# Patient Record
Sex: FEMALE | Race: WHITE | Employment: FULL TIME | URBAN - NONMETROPOLITAN AREA
[De-identification: names, ages, dates, MRNs, and addresses within clinical notes are randomized per-mention and may not be internally consistent; named-entity substitution may affect disease eponyms.]

---

## 2019-12-12 ENCOUNTER — TRANSFERRED RECORDS (OUTPATIENT)
Dept: HEALTH INFORMATION MANAGEMENT | Facility: OTHER | Age: 39
End: 2019-12-12

## 2020-02-24 ENCOUNTER — TRANSFERRED RECORDS (OUTPATIENT)
Dept: HEALTH INFORMATION MANAGEMENT | Facility: OTHER | Age: 40
End: 2020-02-24

## 2020-04-07 ENCOUNTER — TRANSFERRED RECORDS (OUTPATIENT)
Dept: HEALTH INFORMATION MANAGEMENT | Facility: OTHER | Age: 40
End: 2020-04-07

## 2020-06-08 ENCOUNTER — TRANSFERRED RECORDS (OUTPATIENT)
Dept: HEALTH INFORMATION MANAGEMENT | Facility: OTHER | Age: 40
End: 2020-06-08

## 2020-08-11 DIAGNOSIS — M79.671 RIGHT FOOT PAIN: Primary | ICD-10-CM

## 2020-08-13 ENCOUNTER — OFFICE VISIT (OUTPATIENT)
Dept: ORTHOPEDICS | Facility: OTHER | Age: 40
End: 2020-08-13
Attending: PODIATRIST
Payer: COMMERCIAL

## 2020-08-13 ENCOUNTER — HOSPITAL ENCOUNTER (OUTPATIENT)
Dept: GENERAL RADIOLOGY | Facility: OTHER | Age: 40
End: 2020-08-13
Attending: PODIATRIST
Payer: COMMERCIAL

## 2020-08-13 VITALS — SYSTOLIC BLOOD PRESSURE: 126 MMHG | HEART RATE: 60 BPM | WEIGHT: 178 LBS | DIASTOLIC BLOOD PRESSURE: 82 MMHG

## 2020-08-13 DIAGNOSIS — M84.474K: Primary | ICD-10-CM

## 2020-08-13 DIAGNOSIS — M79.671 RIGHT FOOT PAIN: ICD-10-CM

## 2020-08-13 PROCEDURE — 99202 OFFICE O/P NEW SF 15 MIN: CPT | Performed by: PODIATRIST

## 2020-08-13 PROCEDURE — 73630 X-RAY EXAM OF FOOT: CPT | Mod: RT

## 2020-08-13 RX ORDER — BUPROPION HYDROCHLORIDE 300 MG/1
TABLET ORAL
COMMUNITY
Start: 2020-07-12

## 2020-08-13 NOTE — PROGRESS NOTES
Visit Date:   08/13/2020      HISTORY OF PRESENT ILLNESS:  Perry abdalla a 40-year-old with a fifth metatarsal nonunion.  She had attempted fixation of this in December of last year.  Unfortunately, she did not get healing here.  Continues to have pain.  Would like to have this revised.         HISTORY REVIEW:  I have reviewed this patient's past medical history, family history, social history as well as medications and allergies.  Any changes/additions were appropriately charted in the patient's electronic medical record.        PHYSICAL EXAMINATION:    CONSTITUTIONAL:  The patient is alert and oriented x3, well appearing and in no apparent distress.  Affect is pleasant and answers questions appropriately.   VASCULAR:  Circulation is intact with palpable pedal pulses and adequate capillary fill time to all digits.  Hair growth is present and appropriate to mid foot and digits.   NEUROLOGIC:  Light touch sensation is intact to digits.  There is a negative Tinel sign.  There are no signs of apparent nerve entrapment of superficial peroneal or common peroneal nerves.   INTEGUMENT:  No abnormal dermatologic lesions are noted.  Skin has normal texture and turgor.     MUSCULOSKELETAL:  Very enlarged styloid process and fifth metatarsal palpable hardware.  Tender to palpate surrounding.  Does walk with an antalgic gait due to this.      IMAGING:  Three views of the foot demonstrate nonunion fifth metatarsal base.  Hardware is otherwise intact, just no healing through the fracture.      ASSESSMENT:  Nonunion fifth metatarsal base.      PLAN:  I discussed condition and treatment with the patient today.  We did discuss findings on imaging with continued clinical pain and concerns.  I did recommend revising this versus excising this fracture fragment.  We will get her scheduled here in the coming weeks.  I will see her back postoperatively.  She will get a preop prior to proceeding.  We discussed surgery, recovery, risks, potential  complications, alternatives and benefits in detail,         DAKOTA LINN DPM             D: 2020   T: 2020   MT: CALE      Name:     SRINIVASAN GE   MRN:      [id number]        Account:      [id number]   :      1980           Visit Date:   2020      Document: [id number]

## 2020-08-13 NOTE — PROGRESS NOTES
Patient is here for consult on her right foot pain.  Gracy Mendoza LPN .....................8/13/2020 8:59 AM

## 2020-09-17 ENCOUNTER — TRANSFERRED RECORDS (OUTPATIENT)
Dept: HEALTH INFORMATION MANAGEMENT | Facility: OTHER | Age: 40
End: 2020-09-17

## 2020-09-23 ASSESSMENT — MIFFLIN-ST. JEOR: SCORE: 1467.88

## 2020-09-28 ENCOUNTER — TRANSFERRED RECORDS (OUTPATIENT)
Dept: HEALTH INFORMATION MANAGEMENT | Facility: OTHER | Age: 40
End: 2020-09-28

## 2020-09-28 ENCOUNTER — ALLIED HEALTH/NURSE VISIT (OUTPATIENT)
Dept: FAMILY MEDICINE | Facility: OTHER | Age: 40
End: 2020-09-28
Attending: PODIATRIST
Payer: COMMERCIAL

## 2020-09-28 DIAGNOSIS — Z20.822 COVID-19 RULED OUT: Primary | ICD-10-CM

## 2020-09-28 PROCEDURE — U0003 INFECTIOUS AGENT DETECTION BY NUCLEIC ACID (DNA OR RNA); SEVERE ACUTE RESPIRATORY SYNDROME CORONAVIRUS 2 (SARS-COV-2) (CORONAVIRUS DISEASE [COVID-19]), AMPLIFIED PROBE TECHNIQUE, MAKING USE OF HIGH THROUGHPUT TECHNOLOGIES AS DESCRIBED BY CMS-2020-01-R: HCPCS | Mod: ZL | Performed by: PODIATRIST

## 2020-09-28 PROCEDURE — C9803 HOPD COVID-19 SPEC COLLECT: HCPCS

## 2020-09-28 PROCEDURE — 99207 ZZC DROP WITH A PROCEDURE: CPT | Mod: 25

## 2020-09-29 LAB
SARS-COV-2 RNA SPEC QL NAA+PROBE: NOT DETECTED
SPECIMEN SOURCE: NORMAL

## 2020-09-30 ENCOUNTER — ANESTHESIA EVENT (OUTPATIENT)
Dept: SURGERY | Facility: OTHER | Age: 40
End: 2020-09-30
Payer: COMMERCIAL

## 2020-10-01 ENCOUNTER — HOSPITAL ENCOUNTER (OUTPATIENT)
Facility: OTHER | Age: 40
Discharge: HOME | End: 2020-10-01
Attending: PODIATRIST | Admitting: PODIATRIST
Payer: COMMERCIAL

## 2020-10-01 ENCOUNTER — ANESTHESIA (OUTPATIENT)
Dept: SURGERY | Facility: OTHER | Age: 40
End: 2020-10-01
Payer: COMMERCIAL

## 2020-10-01 ENCOUNTER — HOSPITAL ENCOUNTER (OUTPATIENT)
Dept: GENERAL RADIOLOGY | Facility: OTHER | Age: 40
End: 2020-10-01
Attending: PODIATRIST | Admitting: PODIATRIST
Payer: COMMERCIAL

## 2020-10-01 VITALS
DIASTOLIC BLOOD PRESSURE: 90 MMHG | HEIGHT: 65 IN | SYSTOLIC BLOOD PRESSURE: 130 MMHG | WEIGHT: 176 LBS | TEMPERATURE: 97.8 F | BODY MASS INDEX: 29.32 KG/M2 | RESPIRATION RATE: 16 BRPM | HEART RATE: 58 BPM | OXYGEN SATURATION: 100 %

## 2020-10-01 DIAGNOSIS — M84.474K: ICD-10-CM

## 2020-10-01 DIAGNOSIS — Z87.81 S/P ORIF (OPEN REDUCTION INTERNAL FIXATION) FRACTURE: ICD-10-CM

## 2020-10-01 DIAGNOSIS — Z98.890 S/P ORIF (OPEN REDUCTION INTERNAL FIXATION) FRACTURE: ICD-10-CM

## 2020-10-01 PROCEDURE — 20680 REMOVAL OF IMPLANT DEEP: CPT | Mod: XS | Performed by: PODIATRIST

## 2020-10-01 PROCEDURE — 28322 REPAIR OF METATARSALS: CPT | Performed by: NURSE ANESTHETIST, CERTIFIED REGISTERED

## 2020-10-01 PROCEDURE — 360N000021 HC SURGERY LEVEL 3 EA 15 ADDTL MIN: Performed by: PODIATRIST

## 2020-10-01 PROCEDURE — 278N000051 HC OR IMPLANT GENERAL: Performed by: PODIATRIST

## 2020-10-01 PROCEDURE — 250N000009 HC RX 250: Performed by: NURSE ANESTHETIST, CERTIFIED REGISTERED

## 2020-10-01 PROCEDURE — 360N000024 HC SURGERY LEVEL 3 W FLUORO 1ST 30 MIN: Performed by: PODIATRIST

## 2020-10-01 PROCEDURE — 999N000136 HC STATISTIC PRE PROC ASSESS II: Performed by: PODIATRIST

## 2020-10-01 PROCEDURE — 272N000002 HC OR SUPPLY OTHER OPNP: Performed by: PODIATRIST

## 2020-10-01 PROCEDURE — 250N000009 HC RX 250: Performed by: PODIATRIST

## 2020-10-01 PROCEDURE — 28322 REPAIR OF METATARSALS: CPT | Mod: RT | Performed by: PODIATRIST

## 2020-10-01 PROCEDURE — C1713 ANCHOR/SCREW BN/BN,TIS/BN: HCPCS | Performed by: PODIATRIST

## 2020-10-01 PROCEDURE — 370N000002 HC ANESTHESIA TECHNICAL FEE, EACH ADDTL 15 MIN: Performed by: PODIATRIST

## 2020-10-01 PROCEDURE — 250N000011 HC RX IP 250 OP 636: Performed by: NURSE ANESTHETIST, CERTIFIED REGISTERED

## 2020-10-01 PROCEDURE — 999N000180 XR SURGERY CARM FLUORO LESS THAN 5 MIN

## 2020-10-01 PROCEDURE — 761N000007 HC RECOVERY PHASE 2 EACH 15 MINS: Performed by: PODIATRIST

## 2020-10-01 PROCEDURE — 370N000001 HC ANESTHESIA TECHNICAL FEE, 1ST 30 MIN: Performed by: PODIATRIST

## 2020-10-01 PROCEDURE — 272N000001 HC OR GENERAL SUPPLY STERILE: Performed by: PODIATRIST

## 2020-10-01 DEVICE — PIN TIP, DRILL, 3.5MM
Type: IMPLANTABLE DEVICE | Site: FOOT | Status: FUNCTIONAL
Brand: ARTHREX®

## 2020-10-01 RX ORDER — HYDROMORPHONE HYDROCHLORIDE 1 MG/ML
.3-.5 INJECTION, SOLUTION INTRAMUSCULAR; INTRAVENOUS; SUBCUTANEOUS EVERY 10 MIN PRN
Status: DISCONTINUED | OUTPATIENT
Start: 2020-10-01 | End: 2020-10-01 | Stop reason: HOSPADM

## 2020-10-01 RX ORDER — PROPOFOL 10 MG/ML
INJECTION, EMULSION INTRAVENOUS PRN
Status: DISCONTINUED | OUTPATIENT
Start: 2020-10-01 | End: 2020-10-01

## 2020-10-01 RX ORDER — PROPOFOL 10 MG/ML
INJECTION, EMULSION INTRAVENOUS CONTINUOUS PRN
Status: DISCONTINUED | OUTPATIENT
Start: 2020-10-01 | End: 2020-10-01

## 2020-10-01 RX ORDER — CLINDAMYCIN PHOSPHATE 900 MG/50ML
900 INJECTION, SOLUTION INTRAVENOUS SEE ADMIN INSTRUCTIONS
Status: DISCONTINUED | OUTPATIENT
Start: 2020-10-01 | End: 2020-10-01 | Stop reason: HOSPADM

## 2020-10-01 RX ORDER — ACETAMINOPHEN 325 MG/1
650 TABLET ORAL
Status: DISCONTINUED | OUTPATIENT
Start: 2020-10-01 | End: 2020-10-01 | Stop reason: HOSPADM

## 2020-10-01 RX ORDER — LIDOCAINE HYDROCHLORIDE 20 MG/ML
INJECTION, SOLUTION INFILTRATION; PERINEURAL PRN
Status: DISCONTINUED | OUTPATIENT
Start: 2020-10-01 | End: 2020-10-01

## 2020-10-01 RX ORDER — OXYCODONE HYDROCHLORIDE 5 MG/1
10 TABLET ORAL
Status: DISCONTINUED | OUTPATIENT
Start: 2020-10-01 | End: 2020-10-01 | Stop reason: HOSPADM

## 2020-10-01 RX ORDER — ONDANSETRON 4 MG/1
4 TABLET, ORALLY DISINTEGRATING ORAL EVERY 30 MIN PRN
Status: DISCONTINUED | OUTPATIENT
Start: 2020-10-01 | End: 2020-10-01 | Stop reason: HOSPADM

## 2020-10-01 RX ORDER — MEPERIDINE HYDROCHLORIDE 50 MG/ML
12.5 INJECTION INTRAMUSCULAR; INTRAVENOUS; SUBCUTANEOUS
Status: DISCONTINUED | OUTPATIENT
Start: 2020-10-01 | End: 2020-10-01 | Stop reason: HOSPADM

## 2020-10-01 RX ORDER — BUPIVACAINE HYDROCHLORIDE 5 MG/ML
INJECTION, SOLUTION EPIDURAL; INTRACAUDAL PRN
Status: DISCONTINUED | OUTPATIENT
Start: 2020-10-01 | End: 2020-10-01

## 2020-10-01 RX ORDER — MAGNESIUM HYDROXIDE 1200 MG/15ML
LIQUID ORAL PRN
Status: DISCONTINUED | OUTPATIENT
Start: 2020-10-01 | End: 2020-10-01 | Stop reason: HOSPADM

## 2020-10-01 RX ORDER — DEXAMETHASONE SODIUM PHOSPHATE 10 MG/ML
INJECTION, SOLUTION INTRAMUSCULAR; INTRAVENOUS PRN
Status: DISCONTINUED | OUTPATIENT
Start: 2020-10-01 | End: 2020-10-01

## 2020-10-01 RX ORDER — NALOXONE HYDROCHLORIDE 0.4 MG/ML
.1-.4 INJECTION, SOLUTION INTRAMUSCULAR; INTRAVENOUS; SUBCUTANEOUS
Status: DISCONTINUED | OUTPATIENT
Start: 2020-10-01 | End: 2020-10-01 | Stop reason: HOSPADM

## 2020-10-01 RX ORDER — SODIUM CHLORIDE, SODIUM LACTATE, POTASSIUM CHLORIDE, CALCIUM CHLORIDE 600; 310; 30; 20 MG/100ML; MG/100ML; MG/100ML; MG/100ML
INJECTION, SOLUTION INTRAVENOUS CONTINUOUS
Status: DISCONTINUED | OUTPATIENT
Start: 2020-10-01 | End: 2020-10-01 | Stop reason: HOSPADM

## 2020-10-01 RX ORDER — OXYCODONE HYDROCHLORIDE 5 MG/1
5-10 TABLET ORAL EVERY 4 HOURS PRN
Qty: 16 TABLET | Refills: 0 | Status: SHIPPED | OUTPATIENT
Start: 2020-10-01

## 2020-10-01 RX ORDER — ACETAMINOPHEN 325 MG/1
650 TABLET ORAL EVERY 4 HOURS PRN
Qty: 50 TABLET | Refills: 0 | Status: SHIPPED | OUTPATIENT
Start: 2020-10-01

## 2020-10-01 RX ORDER — ONDANSETRON 2 MG/ML
4 INJECTION INTRAMUSCULAR; INTRAVENOUS EVERY 30 MIN PRN
Status: DISCONTINUED | OUTPATIENT
Start: 2020-10-01 | End: 2020-10-01 | Stop reason: HOSPADM

## 2020-10-01 RX ORDER — FENTANYL CITRATE 50 UG/ML
25-50 INJECTION, SOLUTION INTRAMUSCULAR; INTRAVENOUS EVERY 5 MIN PRN
Status: DISCONTINUED | OUTPATIENT
Start: 2020-10-01 | End: 2020-10-01 | Stop reason: HOSPADM

## 2020-10-01 RX ORDER — HYDROXYZINE HYDROCHLORIDE 10 MG/1
20 TABLET, FILM COATED ORAL
Status: DISCONTINUED | OUTPATIENT
Start: 2020-10-01 | End: 2020-10-01 | Stop reason: HOSPADM

## 2020-10-01 RX ORDER — LIDOCAINE 40 MG/G
CREAM TOPICAL
Status: DISCONTINUED | OUTPATIENT
Start: 2020-10-01 | End: 2020-10-01 | Stop reason: HOSPADM

## 2020-10-01 RX ORDER — CLINDAMYCIN PHOSPHATE 900 MG/50ML
900 INJECTION, SOLUTION INTRAVENOUS
Status: DISCONTINUED | OUTPATIENT
Start: 2020-10-01 | End: 2020-10-01 | Stop reason: HOSPADM

## 2020-10-01 RX ORDER — IBUPROFEN 600 MG/1
600 TABLET, FILM COATED ORAL EVERY 6 HOURS PRN
Qty: 30 TABLET | Refills: 0 | Status: SHIPPED | OUTPATIENT
Start: 2020-10-01

## 2020-10-01 RX ADMIN — BUPIVACAINE HYDROCHLORIDE 30 ML: 5 INJECTION, SOLUTION EPIDURAL; INTRACAUDAL; PERINEURAL at 08:28

## 2020-10-01 RX ADMIN — PROPOFOL 50 MG: 10 INJECTION, EMULSION INTRAVENOUS at 10:11

## 2020-10-01 RX ADMIN — CLINDAMYCIN IN 5 PERCENT DEXTROSE 900 MG: 18 INJECTION, SOLUTION INTRAVENOUS at 10:07

## 2020-10-01 RX ADMIN — LIDOCAINE HYDROCHLORIDE 60 MG: 20 INJECTION, SOLUTION INFILTRATION; PERINEURAL at 10:11

## 2020-10-01 RX ADMIN — MIDAZOLAM 2 MG: 1 INJECTION INTRAMUSCULAR; INTRAVENOUS at 10:11

## 2020-10-01 RX ADMIN — PROPOFOL 75 MCG/KG/MIN: 10 INJECTION, EMULSION INTRAVENOUS at 10:11

## 2020-10-01 RX ADMIN — DEXAMETHASONE SODIUM PHOSPHATE 4 MG: 10 INJECTION, SOLUTION INTRAMUSCULAR; INTRAVENOUS at 08:28

## 2020-10-01 ASSESSMENT — MIFFLIN-ST. JEOR: SCORE: 1464.21

## 2020-10-01 NOTE — DISCHARGE INSTRUCTIONS
[facility]  Adult Discharge Orders & Instructions        For 24 hours after surgery:  1. Get plenty of rest.  A responsible adult must stay with you for at least 24 hours after you leave the hospital.   2. You may feel lightheaded.  IF so, sit for a few minutes before standing.  Have someone help you get up.   3. You may have a slight fever. Call the doctor if your fever is over 101 F (38.3 C) (taken under the tongue) or lasts longer than 24 hours.  4. You may have a dry mouth, a sore throat, muscle aches or trouble sleeping.  These should go away after 24 hours.  5. Do not make important or legal decisions.  6.   Do not drive or use heavy equipment.  If you have weakness or tingling, don't drive or use heavy equipment until this feeling goes away.                                                                                                                                                                         To contact a doctor, call    [phone]______________  Surgeon Contact Information  If you have questions or concerns related to your procedure please contact your surgeon through [facility] at [phone].

## 2020-10-01 NOTE — OP NOTE
Procedure Date: 10/01/2020      SURGEON:  Matt Mtz DPM      PREOPERATIVE DIAGNOSIS:  Right fifth metatarsal nonunion retained hardware.      POSTOPERATIVE DIAGNOSIS:  Right fifth metatarsal nonunion retained hardware.      PROCEDURES:   1.  Hardware removal, previous fifth metatarsal ORIF.   2.  Management of nonunion of fifth metatarsal.      ANESTHESIA:  MAC with popliteal saphenous block.      HEMOSTASIS:  Calf tourniquet, electrocautery.      ESTIMATED BLOOD LOSS:  Minimal.      MATERIALS:  Arthrex solid 5.5 mm fifth metatarsal Olson screw.       INDICATIONS FOR PROCEDURE:  The patient has had a number of procedures on this.  She has a nonunion to the fifth metatarsal, which is painful as well as prominent hardware.  She elected to proceed with surgery as detailed after discussion of risks, potential complications, alternatives and benefits.      DESCRIPTION OF PROCEDURE:  Supine position, utilized MAC anesthesia, popliteal saphenous block performed.  Right lower extremity prepped and draped in the usual sterile fashion.  Calf tourniquet utilized for duration of procedure.        Attention was directed to lateral fifth metatarsal and incision made, largely in full thickness.  Plate and 5 screws were removed without incident.  Incision was extended proximally and I roughened up the nonunion site.  A K-wire was thrown from proximal to distal into the intramedullary canal, it was drilled and tapped for 5.5 screw.  A solid 5.5 screw was then thrown, compressing the nonunion site.  It had excellent fixation.  It was flushed with normal saline.        Deep tissue repaired with 2-0 Vicryl, skin with nonabsorbable suture, placed in a sterile dressing and a Cam boot.  Weightbear as tolerated and follow-up as scheduled.         MATT MTZ DPM             D: 10/01/2020   T: 10/01/2020   MT: MARKUS      Name:     SRINIVASAN GE   MRN:      [id number]        Account:        [id number]   :      1980            Procedure Date: 10/01/2020      Document: [id number]

## 2020-10-01 NOTE — ANESTHESIA POSTPROCEDURE EVALUATION
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    Anesthesia Type:  MAC, Peripheral Nerve Block    Note:  Anesthesia Post Evaluation    Patient location during evaluation: Bedside  Patient participation: Able to fully participate in evaluation  Level of consciousness: awake and alert  Pain management: adequate  Airway patency: patent  Cardiovascular status: acceptable  Respiratory status: acceptable  Hydration status: acceptable  PONV: none     Anesthetic complications: None          Last vitals:  Vitals:    10/01/20 1150 10/01/20 1155 10/01/20 1200   BP:  (!) 130/90    Pulse:  58    Resp:      Temp:  97.8  F (36.6  C)    SpO2: 100% 100% 100%         Electronically Signed By: HEIDI Cevallos CRNA  October 1, 2020  12:47 PM

## 2020-10-01 NOTE — OR NURSING
Dr Mtz explanted 5 morillo screws and 1 morillo plate from Right foot. Meredith Lopez RN on 10/1/2020 at 10:39 AM

## 2020-10-01 NOTE — BRIEF OP NOTE
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  Complications: none.  Implants:   Implant Name Type Inv. Item Serial No.  Lot No. LRB No. Used Action   Guidewire with trochar tip 2.0mm    [employer]  Right 1 Used as a Supply   3.5 mm cannulated drill    [employer]  Right 1 Used as a Supply   tap, cannulated 4.5mm    [employer]  Right 1 Used as a Supply   4.5mm low profile lynn screw titanium partially threaded    ARTHREX  Right 1 Implanted and Explanted   5.5mm low profile lynn screw titanium partially threaded    ARTHREX  Right 1 Implanted

## 2020-10-01 NOTE — ANESTHESIA PREPROCEDURE EVALUATION
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, subsequent encounter [M84.591K]    Procedure(s):  OPEN REDUCTION INTERNAL FIXATION, FOOT, management of non union 5the metatatarsal with hardware removal    Past Medical History:   Diagnosis Date     Arthritis     hand     Depressive disorder      Dysthymic disorder      Eustachian tube dysfunction      Infertility, female      Otitis media      Pityriasis rosea      Plantar fascial fibromatosis      Transient hypertension of pregnancy      Past Surgical History:   Procedure Laterality Date      SECTION      [id number]     OPEN REDUCTION INTERNAL FIXATION FOOT Right      PANNICULECTOMY  2014     TUBAL LIGATION         Anesthesia Evaluation     . Pt has had prior anesthetic. Type: Regional, MAC and General    No history of anesthetic complications          ROS/MED HX    ENT/Pulmonary:  - neg pulmonary ROS     Neurologic:  - neg neurologic ROS     Cardiovascular:     (+) hypertension----. : . . . :. .       METS/Exercise Tolerance:  >4 METS   Hematologic:  - neg hematologic  ROS       Musculoskeletal:  - neg musculoskeletal ROS       GI/Hepatic:  - neg GI/hepatic ROS       Renal/Genitourinary:  - ROS Renal section negative       Endo:  - neg endo ROS       Psychiatric:     (+) psychiatric history depression      Infectious Disease:  - neg infectious disease ROS       Malignancy:      - no malignancy   Other:    - neg other ROS                      Physical Exam  Normal systems: cardiovascular, pulmonary and dental    Airway   Mallampati: II  TM distance: >3 FB  Neck ROM: full    Dental     Cardiovascular   Rhythm and rate: regular and normal      Pulmonary             No results found for: WBC, HGB, HCT, PLT, CRP, SED, NA, POTASSIUM, CHLORIDE, CO2, BUN, CR, GLC, GLENYS, PHOS, MAG, ALBUMIN, PROTTOTAL, ALT, AST,  "GGT, ALKPHOS, BILITOTAL, BILIDIRECT, LIPASE, AMYLASE, TATE, PTT, INR, FIBR, TSH, T4, T3, HCG, HCGS, CKTOTAL, CKMB, TROPN    Preop Vitals  BP Readings from Last 3 Encounters:   10/01/20 (!) 147/80   08/13/20 126/82    Pulse Readings from Last 3 Encounters:   10/01/20 75   08/13/20 60      Resp Readings from Last 3 Encounters:   10/01/20 16    SpO2 Readings from Last 3 Encounters:   10/01/20 98%      Temp Readings from Last 1 Encounters:   10/01/20 97.3  F (36.3  C) (Tympanic)    Ht Readings from Last 1 Encounters:   10/01/20 1.651 m (5' 5\")      Wt Readings from Last 1 Encounters:   10/01/20 79.8 kg (176 lb)    Estimated body mass index is 29.29 kg/m  as calculated from the following:    Height as of this encounter: 1.651 m (5' 5\").    Weight as of this encounter: 79.8 kg (176 lb).       Anesthesia Plan      History & Physical Review      ASA Status:  1 .    NPO Status:  > 8 hours    Plan for MAC and Peripheral Nerve Block with Intravenous induction. Maintenance will be Balanced.    PONV prophylaxis:  Ondansetron (or other 5HT-3)  Risks, benefits and alternatives discussed and would like to proceed with a popliteal nerve block for post operative nerve block and MAC anesthesia. General anesthesia ok if indicated.           Postoperative Care  Postoperative pain management:  IV analgesics, Multi-modal analgesia and Peripheral nerve block (Single Shot).      Consents  Anesthetic plan, risks, benefits and alternatives discussed with:  Patient and Spouse.  Use of blood products discussed: No .   .                 HEIDI Cevallos CRNA  "

## 2020-10-01 NOTE — ANESTHESIA CARE TRANSFER NOTE
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    Anesthesia Type:   MAC, Peripheral Nerve Block     Note:  Airway :Room Air  Patient transferred to:Phase II  Handoff Report: Identifed the Patient, Identified the Reponsible Provider, Reviewed the pertinent medical history, Discussed the surgical course, Reviewed Intra-OP anesthesia mangement and issues during anesthesia, Set expectations for post-procedure period and Allowed opportunity for questions and acknowledgement of understanding      Vitals: (Last set prior to Anesthesia Care Transfer)    CRNA VITALS  10/1/2020 1038 - 10/1/2020 1112      10/1/2020             Resp Rate (set):  10                Electronically Signed By: HEIDI LY CRNA  October 1, 2020  11:12 AM

## 2020-10-01 NOTE — ANESTHESIA PROCEDURE NOTES
Procedure note : Popliteal      Staff -   CRNA: Juan Diego Hamm APRN CRNA  Performed By: CRNA  Pre-Procedure    Location: pre-op    Procedure Times:10/1/2020 8:22 AM and 10/1/2020 8:30 AM  Pre-Anesthestic Checklist: patient identified, IV checked, site marked, risks and benefits discussed, informed consent, monitors and equipment checked, pre-op evaluation, at physician/surgeon's request and post-op pain management    Timeout  Correct Patient: Yes   Correct Procedure: Yes   Correct Site: Yes   Correct Laterality: Yes   Correct Position: Yes   Site Marked: Yes   .   Procedure Documentation    Diagnosis:FOOT PAIN/POST OPERATIVE PAIN CONTROL.    Procedure: Popliteal, right.   Patient Position:supine   Ultrasound used to identify targeted nerve, plexus, or vascular marker and placed a needle adjacent to it., Ultrasound was used to visualize the spread of the anesthetic in close proximity to the above stated nerve. A permanent image is entered into the patient's record.  Patient Prep/Sterile Barriers; mask, sterile gloves, chlorhexidine gluconate and isopropyl alcohol.  Nerve Stim: Initial Level 0.6 mA.  Lowest motor response 0.2 mA..  Needle:  Needle Gauge: 20.    Needle Length (Inches) 4   .        Assessment/Narrative  Paresthesias: No.  .  The placement was negative for: blood aspirated, painful injection and site bleeding.  Bolus given via needle..   Secured via.   Complications: none. Comments:  UltrasoundGuided Popliteal Sciatic for Post-Op Pain Management    Patient: SRINIVASAN GE  Patient : 1980  Date: 2020  Procedure time:  822  Diagnosis: Ankle pain.    Surgeon requests Popliteal nerve block for post-op pain management.    The procedure, potential benefits, risks, and alternatives were discussed during the preoperative interview with the patient.  The patient voiced understanding of the information and agreed to proceed with the procedure.    Universal protocol was followed.   TIME OUT conducted just prior to starting procedure confirmed patient identity, site/side, procedure, patient position and availability of correct equipment and implants.     Anesthesia: 0.5% Bupivicaine subcutaneously  No sedation    The right lateral thigh was prepped with chlohexidine.  A 20 gauge 4 inch Stimuplex insulated needle was advanced under ultrasound guidance until adjacent to but not within the sciatic nerve at the level of the nerve bifurcation.  30ml of 0.5% Bupivacaine with 2mg of PF Decadron was injected incrementally with confirmation of negative aspiration. There was good visualization of local anesthetic spread around the sciatic nerve with no signs of intraneural or intravascular injection. No parasthesias were elicited either during needle placement or medication injection. There were no other immediate complications apparent. An image was saved to the ultrasound machineand a print was made for the chart.    Electronically Signed by  HEIDI Cevallos CRNA on 10/1/2020 at 8:41 AM

## 2020-10-06 DIAGNOSIS — Z98.890 S/P ORIF (OPEN REDUCTION INTERNAL FIXATION) FRACTURE: Primary | ICD-10-CM

## 2020-10-06 DIAGNOSIS — Z87.81 S/P ORIF (OPEN REDUCTION INTERNAL FIXATION) FRACTURE: Primary | ICD-10-CM

## 2020-10-08 ENCOUNTER — OFFICE VISIT (OUTPATIENT)
Dept: ORTHOPEDICS | Facility: OTHER | Age: 40
End: 2020-10-08
Attending: PODIATRIST
Payer: COMMERCIAL

## 2020-10-08 ENCOUNTER — HOSPITAL ENCOUNTER (OUTPATIENT)
Dept: GENERAL RADIOLOGY | Facility: OTHER | Age: 40
End: 2020-10-08
Attending: PODIATRIST
Payer: COMMERCIAL

## 2020-10-08 DIAGNOSIS — Z87.81 S/P ORIF (OPEN REDUCTION INTERNAL FIXATION) FRACTURE: ICD-10-CM

## 2020-10-08 DIAGNOSIS — Z09 POSTOP CHECK: Primary | ICD-10-CM

## 2020-10-08 DIAGNOSIS — Z98.890 S/P ORIF (OPEN REDUCTION INTERNAL FIXATION) FRACTURE: ICD-10-CM

## 2020-10-08 PROCEDURE — 99024 POSTOP FOLLOW-UP VISIT: CPT | Performed by: PODIATRIST

## 2020-10-08 PROCEDURE — 73630 X-RAY EXAM OF FOOT: CPT | Mod: RT

## 2020-10-08 NOTE — PROGRESS NOTES
Patient is here for follow up on her right foot.  Gracy Mendoza LPN .....................10/8/2020 12:18 PM

## 2020-10-08 NOTE — PROGRESS NOTES
Visit Date:   10/08/2020      HISTORY OF PRESENT ILLNESS:  Srinivasan is here a week out from management of fifth metatarsal nonunion.  Doing well, no acute concern.      PHYSICAL EXAMINATION:  Surgical site healed.  Sutures removed, Steri-Strips applied.  CMS is intact.  No signs of infection.      IMAGING:  Three views of the foot demonstrated intact intramedullary screw, which was well seated.      ASSESSMENT:  Status post right fifth metatarsal management of nonunion and hardware removal.      PLAN:  I discussed progression of treatment.  The patient will progress weightbearing in her boot only.  I will see her back in a month.  Repeat x-rays and progress as able.         DAKOTA LINN DPM             D: 10/08/2020   T: 10/08/2020   MT: KAYE      Name:     SRINIVASAN GE   MRN:      [id number]        Account:      [id number]   :      1980           Visit Date:   10/08/2020      Document: [id number]

## 2020-10-28 DIAGNOSIS — Z87.81 S/P ORIF (OPEN REDUCTION INTERNAL FIXATION) FRACTURE: Primary | ICD-10-CM

## 2020-10-28 DIAGNOSIS — Z98.890 S/P ORIF (OPEN REDUCTION INTERNAL FIXATION) FRACTURE: Primary | ICD-10-CM

## 2020-10-29 ENCOUNTER — OFFICE VISIT (OUTPATIENT)
Dept: ORTHOPEDICS | Facility: OTHER | Age: 40
End: 2020-10-29
Attending: PODIATRIST
Payer: COMMERCIAL

## 2020-10-29 ENCOUNTER — HOSPITAL ENCOUNTER (OUTPATIENT)
Dept: GENERAL RADIOLOGY | Facility: OTHER | Age: 40
End: 2020-10-29
Attending: PODIATRIST
Payer: COMMERCIAL

## 2020-10-29 DIAGNOSIS — Z98.890 S/P ORIF (OPEN REDUCTION INTERNAL FIXATION) FRACTURE: ICD-10-CM

## 2020-10-29 DIAGNOSIS — Z09 POSTOP CHECK: Primary | ICD-10-CM

## 2020-10-29 DIAGNOSIS — Z87.81 S/P ORIF (OPEN REDUCTION INTERNAL FIXATION) FRACTURE: ICD-10-CM

## 2020-10-29 PROCEDURE — 99024 POSTOP FOLLOW-UP VISIT: CPT | Performed by: PODIATRIST

## 2020-10-29 PROCEDURE — 73630 X-RAY EXAM OF FOOT: CPT | Mod: RT

## 2020-10-29 NOTE — PROGRESS NOTES
Visit Date:   10/29/2020      HISTORY OF PRESENT ILLNESS:  Srinivasan is here 4 weeks out from ORIF for management of nonunion fifth metatarsal.  She is doing well.  She has minimal symptoms.  She is walking in her boot.  CMS intact.      PHYSICAL EXAMINATION:  Shows well-healed.  No signs of infection.      IMAGING:  Three views of the foot demonstrated intact well-positioned intramedullary screw.  The base fragment still does not show signs of obvious consolidation here.      ASSESSMENT:  Status post management of nonunion fifth metatarsal, which was painful.      PLAN:  I discussed condition and treatment.  I did discuss ongoing options going forward.  We will give this time.  Hopefully, at least at a minimum, it is asymptomatic.  She was having quite a bit of symptoms prior with the nonunion of the hardware.  Hopefully, at least with this screw stabilizing things it is asymptomatic.  If it is not asymptomatic, we will have to consider pseudoscrew removal and fracture fragment excision, which we will look into if need be.         DAKOTA LINN DPM             D: 10/29/2020   T: 10/29/2020   MT: JAYME      Name:     SIRNIVASAN GE   MRN:      [id number]        Account:      [id number]   :      1980           Visit Date:   10/29/2020      Document: [id number]

## 2020-10-29 NOTE — PROGRESS NOTES
Patient is here for follow up on her right foot.   Gracy Mendoza LPN .....................10/29/2020 9:51 AM

## 2020-11-17 DIAGNOSIS — Z98.890 S/P ORIF (OPEN REDUCTION INTERNAL FIXATION) FRACTURE: Primary | ICD-10-CM

## 2020-11-17 DIAGNOSIS — Z87.81 S/P ORIF (OPEN REDUCTION INTERNAL FIXATION) FRACTURE: Primary | ICD-10-CM

## 2020-11-19 ENCOUNTER — OFFICE VISIT (OUTPATIENT)
Dept: ORTHOPEDICS | Facility: OTHER | Age: 40
End: 2020-11-19
Attending: PODIATRIST
Payer: COMMERCIAL

## 2020-11-19 ENCOUNTER — HOSPITAL ENCOUNTER (OUTPATIENT)
Dept: GENERAL RADIOLOGY | Facility: OTHER | Age: 40
End: 2020-11-19
Attending: PODIATRIST
Payer: COMMERCIAL

## 2020-11-19 DIAGNOSIS — Z87.81 S/P ORIF (OPEN REDUCTION INTERNAL FIXATION) FRACTURE: ICD-10-CM

## 2020-11-19 DIAGNOSIS — Z09 POSTOP CHECK: Primary | ICD-10-CM

## 2020-11-19 DIAGNOSIS — Z98.890 S/P ORIF (OPEN REDUCTION INTERNAL FIXATION) FRACTURE: ICD-10-CM

## 2020-11-19 PROCEDURE — 73630 X-RAY EXAM OF FOOT: CPT | Mod: RT

## 2020-11-19 PROCEDURE — 99024 POSTOP FOLLOW-UP VISIT: CPT | Performed by: PODIATRIST

## 2020-11-19 NOTE — PROGRESS NOTES
Patient is here for follow up on her right foot ORIF.  Gracy Mendoza LPN .....................11/19/2020 2:02 PM

## 2020-11-20 NOTE — PROGRESS NOTES
Visit Date:   2020      SUBJECTIVE:  Srinivasan is here 7 weeks out from ORIF management of nonunion of fifth metatarsal.  Doing well, no acute concerns.  Still has some symptoms, but minimal.  Here today for a followup.      PHYSICAL EXAMINATION:  No concerns clinically.  No signs of infection.  She still has tenderness to the base of fifth metatarsal to palpate.      IMAGING:  Three views of the foot demonstrate a slow healing fifth metatarsal base fracture.  Intact well-positioned screw.      PLAN:  Discussed condition and treatment.  Continue to monitor this radiographically; get x-rays next month.  We did discuss potential need for hardware removal and excision of fracture fragment, which is the other option at the time of surgery and she would like this done this calendar year if it is not showing any signs of healing.  Today it does show some signs of healing.  Actually she looks relatively good, but we will get x-rays next month and try to work her in quickly if we need to, dependent on findings at that time.         DAKOTA LINN DPM             D: 2020   T: 2020   MT: LIUDMILA      Name:     SRINIVASAN GE   MRN:      [id number]        Account:      [id number]   :      1980           Visit Date:   2020      Document: [id number]

## 2020-12-15 DIAGNOSIS — Z98.890 S/P ORIF (OPEN REDUCTION INTERNAL FIXATION) FRACTURE: Primary | ICD-10-CM

## 2020-12-15 DIAGNOSIS — Z87.81 S/P ORIF (OPEN REDUCTION INTERNAL FIXATION) FRACTURE: Primary | ICD-10-CM

## 2020-12-17 ENCOUNTER — HOSPITAL ENCOUNTER (OUTPATIENT)
Dept: GENERAL RADIOLOGY | Facility: OTHER | Age: 40
End: 2020-12-17
Attending: PODIATRIST
Payer: COMMERCIAL

## 2020-12-17 ENCOUNTER — OFFICE VISIT (OUTPATIENT)
Dept: ORTHOPEDICS | Facility: OTHER | Age: 40
End: 2020-12-17
Attending: PODIATRIST
Payer: COMMERCIAL

## 2020-12-17 DIAGNOSIS — Z98.890 S/P ORIF (OPEN REDUCTION INTERNAL FIXATION) FRACTURE: ICD-10-CM

## 2020-12-17 DIAGNOSIS — Z09 POSTOP CHECK: Primary | ICD-10-CM

## 2020-12-17 DIAGNOSIS — Z87.81 S/P ORIF (OPEN REDUCTION INTERNAL FIXATION) FRACTURE: ICD-10-CM

## 2020-12-17 PROCEDURE — 99024 POSTOP FOLLOW-UP VISIT: CPT | Performed by: PODIATRIST

## 2020-12-17 PROCEDURE — 73630 X-RAY EXAM OF FOOT: CPT | Mod: RT

## 2020-12-17 NOTE — PROGRESS NOTES
Patient is here for follow up on her right foot.   Gracy Mendoza LPN .....................12/17/2020 1:18 PM

## 2020-12-17 NOTE — PROGRESS NOTES
Visit Date:   2020      HISTORY OF PRESENT ILLNESS:  Srinivasan is here now 11 weeks out from ORIF and management of nonunion fifth metatarsal.  Unfortunately, this has not been successful.  She still had some gapping of the fracture site.  The hardware is causing her pain.  At that time, I did not want to take the chunk of bone out.  It was large and there is some attachment of the peroneus brevis, but I think at this point,  given ongoing symptoms, we will have to plan on this.      PHYSICAL EXAMINATION:  No signs of infection, just tender to palpate at the surgical site.  CMS is intact.      IMAGING:  Three views of the foot demonstrate some gapping at the fracture site without apparent filling in.  Hardware is otherwise intact well positioned.      ASSESSMENT:  Ongoing pain and continued nonhealing and fifth metatarsal nonunion.      PLAN:  I discussed condition and treatment with the patient today.  Given no signs of healing, It really she continues to regress and has more pain with this.  We will plan on hardware removal, resection of this fracture fragment. We will try to get this done here quickly for her to get her moving get her feeling a little bit better.  I will deal with the peroneal tendon as needed, but hopefully we can avoid the attachment here when we remove this chunk of bone.  I discussed this in detail.  We will schedule here in the next coming weeks to follow accordingly postoperatively.         DAKOTA LINN DPM             D: 2020   T: 2020   MT:       Name:     SRINIVASAN GE   MRN:      [id number]        Account:      [id number]   :      1980           Visit Date:   2020      Document: [id number]

## 2021-01-04 ENCOUNTER — HEALTH MAINTENANCE LETTER (OUTPATIENT)
Age: 41
End: 2021-01-04

## 2021-01-07 ENCOUNTER — OFFICE VISIT (OUTPATIENT)
Dept: ORTHOPEDICS | Facility: OTHER | Age: 41
End: 2021-01-07
Attending: PODIATRIST
Payer: COMMERCIAL

## 2021-01-07 DIAGNOSIS — Z09 POSTOP CHECK: Primary | ICD-10-CM

## 2021-01-07 PROCEDURE — 99024 POSTOP FOLLOW-UP VISIT: CPT | Performed by: PODIATRIST

## 2021-01-07 NOTE — PROGRESS NOTES
Patient is here for follow up on her right foot.  Gracy Mendoza LPN .....................1/7/2021 1:53 PM

## 2021-01-08 NOTE — PROGRESS NOTES
Visit Date:   2021      HISTORY OF PRESENT ILLNESS:  Ro comes in today.  She is status post right fifth metatarsal hardware removal, right fifth metatarsal base fracture excision and partial excision of the metatarsal, performed by Dr. Mtz on 2020.  She states that she is doing quite well.  It is too early to tell if she is feeling a whole lot better, just because she is so fresh from surgery.  She is currently in a postop shoe, walking without any assistance.      PHYSICAL EXAMINATION:  Today, the incision is clean and dry.  There are no signs of infection.  She has a moderate amount of swelling in the lateral aspect of the foot at the incision site.  Sutures are intact.  Those were removed today and Steri-Stripped.  She denies any numbness or tingling and denies any calf pain.      ASSESSMENT:  Status post right fifth metatarsal hardware removal of right fifth metatarsal base fragment excision and partial excision of metatarsal, doing well.      PLAN:  To have her continue wearing the postop shoe as tolerated.  She can slowly wean herself out of the postop shoe as she can tolerate a regular shoe, once the swelling has decreased enough to make it comfortable for her.  She will follow-up with Dr. Mtz in 4 weeks, and we will assess her at that time.         DAKOTA MTZ DPM       As dictated by SUDHAKAR JONES            D: 2021   T: 2021   MT: NITESH      Name:     SRINIVASAN GE   MRN:      [id number]        Account:      [id number]   :      1980           Visit Date:   2021      Document: [id number]

## 2021-01-28 ENCOUNTER — OFFICE VISIT (OUTPATIENT)
Dept: ORTHOPEDICS | Facility: OTHER | Age: 41
End: 2021-01-28
Attending: PODIATRIST
Payer: COMMERCIAL

## 2021-01-28 DIAGNOSIS — Z09 POSTOP CHECK: Primary | ICD-10-CM

## 2021-01-28 PROCEDURE — 99024 POSTOP FOLLOW-UP VISIT: CPT | Performed by: PODIATRIST

## 2021-01-28 NOTE — PROGRESS NOTES
Visit Date:   2021      Srinivasan is here 4 weeks out from hardware removal, fifth met base exostectomy.  Doing well, no acute concern.  Feels a lot better now that the hardware is out and the fracture fragment was just excised.      PHYSICAL EXAMINATION:  Surgical site looks good.  Still has some mild prominence here.  There is soft tissue swelling, but overall, significantly improved from previous.      ASSESSMENT:  Status post fifth metatarsal hardware removal, fifth metatarsal fracture fragment excision.      PLAN:  I discussed progression of treatment.  The patient is doing well.  Will progress as tolerated without restriction.  Follow up as necessary.         DAKOTA LINN DPM             D: 2021   T: 2021   MT: AUDREY      Name:     SRINIVASAN GE   MRN:      [id number]        Account:      [id number]   :      1980           Visit Date:   2021      Document: [id number]

## 2021-01-28 NOTE — PROGRESS NOTES
Patient is here for follow up on her right foot.  Gracy Mendoza LPN .....................1/28/2021 10:15 AM

## 2021-10-11 ENCOUNTER — HEALTH MAINTENANCE LETTER (OUTPATIENT)
Age: 41
End: 2021-10-11

## 2022-01-30 ENCOUNTER — HEALTH MAINTENANCE LETTER (OUTPATIENT)
Age: 42
End: 2022-01-30

## 2022-09-24 ENCOUNTER — HEALTH MAINTENANCE LETTER (OUTPATIENT)
Age: 42
End: 2022-09-24

## 2023-05-08 ENCOUNTER — HEALTH MAINTENANCE LETTER (OUTPATIENT)
Age: 43
End: 2023-05-08

## 2024-02-25 ENCOUNTER — HEALTH MAINTENANCE LETTER (OUTPATIENT)
Age: 44
End: 2024-02-25

## (undated) DEVICE — COVER LIGHT HANDLE LT-F02

## (undated) DEVICE — TOURNIQUET SGL BLADDER 18"X4" RED 5921-218-135

## (undated) DEVICE — SU ETHILON 4-0 PS-2 18" 1667G

## (undated) DEVICE — DRAPE C-ARMOR 5 SIDED 5523

## (undated) DEVICE — SU VICRYL 2-0 SH 27" UND J417H

## (undated) DEVICE — GLOVE BIOGEL 7.5 LATEX

## (undated) DEVICE — DRSG GAUZE 4X4" TRAY 6939

## (undated) DEVICE — PREP CHLORAPREP 26ML TINTED ORANGE  260815

## (undated) DEVICE — BLADE KNIFE SURG 15 371115

## (undated) DEVICE — DRAPE C-ARM PACK 9"

## (undated) DEVICE — PACK MAJOR EXTREMITY SOP15MEFCA

## (undated) DEVICE — DRAPE EXTREMITY W/ARMBOARD 29405

## (undated) DEVICE — GLOVE PROTEXIS PI ORTHO PF 8.0 2D73HT80

## (undated) DEVICE — Device

## (undated) DEVICE — DRSG KERLIX 4 1/2"X4YDS ROLL 6715

## (undated) DEVICE — DRSG ABDOMINAL PAD UNSTERILE 5X9" 9190

## (undated) DEVICE — GLOVE BIOGEL PI INDICATOR 8.0 LF 41680

## (undated) DEVICE — NDL 25GA 1.5" 305127

## (undated) DEVICE — DRSG XEROFORM 1X8"

## (undated) DEVICE — SUTURE 3-0 PROLENE FS-1 631G

## (undated) DEVICE — ESU EYE HIGH TEMP 220DEG FINE TIP 65410-181

## (undated) DEVICE — SOL WATER 1500ML

## (undated) DEVICE — GLOVE PROTEXIS PI ORTHO PF 7.5 2D73HT75

## (undated) DEVICE — BNDG ELASTIC 4"X5YDS UNSTERILE 6611-40

## (undated) RX ORDER — CLINDAMYCIN PHOSPHATE 900 MG/50ML
INJECTION, SOLUTION INTRAVENOUS
Status: DISPENSED
Start: 2020-10-01

## (undated) RX ORDER — CEFAZOLIN SODIUM 2 G/100ML
INJECTION, SOLUTION INTRAVENOUS
Status: DISPENSED
Start: 2020-10-01

## (undated) RX ORDER — BUPIVACAINE HYDROCHLORIDE 5 MG/ML
INJECTION, SOLUTION EPIDURAL; INTRACAUDAL
Status: DISPENSED
Start: 2020-10-01

## (undated) RX ORDER — LIDOCAINE HYDROCHLORIDE 20 MG/ML
INJECTION, SOLUTION EPIDURAL; INFILTRATION; INTRACAUDAL; PERINEURAL
Status: DISPENSED
Start: 2020-10-01

## (undated) RX ORDER — PROPOFOL 10 MG/ML
INJECTION, EMULSION INTRAVENOUS
Status: DISPENSED
Start: 2020-10-01

## (undated) RX ORDER — ONDANSETRON 2 MG/ML
INJECTION INTRAMUSCULAR; INTRAVENOUS
Status: DISPENSED
Start: 2020-10-01